# Patient Record
Sex: MALE | Race: WHITE | URBAN - METROPOLITAN AREA
[De-identification: names, ages, dates, MRNs, and addresses within clinical notes are randomized per-mention and may not be internally consistent; named-entity substitution may affect disease eponyms.]

---
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
## 2024-08-25 ENCOUNTER — HOSPITAL ENCOUNTER (EMERGENCY)
Age: 19
Discharge: HOME | End: 2024-08-25
Payer: COMMERCIAL

## 2024-08-25 VITALS
OXYGEN SATURATION: 97 % | SYSTOLIC BLOOD PRESSURE: 126 MMHG | TEMPERATURE: 98 F | RESPIRATION RATE: 18 BRPM | WEIGHT: 188 LBS | DIASTOLIC BLOOD PRESSURE: 87 MMHG | HEART RATE: 63 BPM

## 2024-08-25 DIAGNOSIS — S09.92XA INJURY OF NOSE, INITIAL ENCOUNTER: Primary | ICD-10-CM

## 2024-08-25 PROCEDURE — 99282 EMERGENCY DEPT VISIT SF MDM: CPT

## 2024-08-25 ASSESSMENT — PAIN - FUNCTIONAL ASSESSMENT: PAIN_FUNCTIONAL_ASSESSMENT: 0-10

## 2024-08-25 ASSESSMENT — PAIN SCALES - GENERAL: PAINLEVEL_OUTOF10: 6

## 2024-08-27 NOTE — ED PROVIDER NOTES
[facility]  ED  EMERGENCY DEPARTMENT ENCOUNTER        Pt Name: Александр Gonzalez  MRN: [id number]  Birthdate 2005  Date of evaluation: 8/25/2024  Provider: JOCELINE Norman - CNP  PCP: Hammad Esparza MD        ALPHONSO. I have evaluated this patient.        CHIEF COMPLAINT       Chief Complaint   Patient presents with    Facial Injury     Pt was hit in nose by somebody's head. Had a nose bleed and thinks it could be broken.       HISTORY OF PRESENT ILLNESS: 1 or more Elements     History From: the patient  Limitations to history : None    Александр Gonzalez is a 19 y.o. male who presents to the ER today with complaints of a nasal injury.  Patient was playing vascular today when he got hit in the nose by somebody's head, states that he had some bleeding and was concerned about a possible broken nose.  No loss of consciousness, is not anticoagulated, no bleeding currently.  He has no other injuries.  He is here for further evaluation.    Nursing Notes were all reviewed and agreed with or any disagreements were addressed in the HPI.    REVIEW OF SYSTEMS :      Review of Systems    Positives and Pertinent negatives as per HPI.     SURGICAL HISTORY   History reviewed. No pertinent surgical history.    CURRENTMEDICATIONS     There are no discharge medications for this patient.      ALLERGIES     Patient has no known allergies.    FAMILYHISTORY     History reviewed. No pertinent family history.     SOCIAL HISTORY       Social History     Tobacco Use    Smoking status: Never    Smokeless tobacco: Never   Substance Use Topics    Alcohol use: Not Currently    Drug use: Not Currently       SCREENINGS        [place] Coma Scale  Eye Opening: Spontaneous  Best Verbal Response: Oriented  Best Motor Response: Obeys commands  [place] Coma Scale Score: 15                CIWA Assessment  BP: 126/87  Pulse: 63           PHYSICAL EXAM  1 or more Elements     ED Triage Vitals [08/25/24 1838]   BP Systolic BP Percentile  up      DISCHARGE MEDICATIONS:  There are no discharge medications for this patient.      DISCONTINUED MEDICATIONS:  There are no discharge medications for this patient.             (Please note that portions of this note were completed with a voice recognition program.  Efforts were made to edit the dictations but occasionally words are mis-transcribed.)    JOCELINE Norman CNP (electronically signed)       Thierry Kaminski APRN - CNP  08/27/24 0003